# Patient Record
Sex: FEMALE | Race: WHITE | NOT HISPANIC OR LATINO | Employment: FULL TIME | URBAN - METROPOLITAN AREA
[De-identification: names, ages, dates, MRNs, and addresses within clinical notes are randomized per-mention and may not be internally consistent; named-entity substitution may affect disease eponyms.]

---

## 2025-03-18 ENCOUNTER — APPOINTMENT (OUTPATIENT)
Dept: OBSTETRICS AND GYNECOLOGY | Facility: CLINIC | Age: 50
End: 2025-03-18
Payer: COMMERCIAL

## 2025-03-18 VITALS
DIASTOLIC BLOOD PRESSURE: 98 MMHG | SYSTOLIC BLOOD PRESSURE: 144 MMHG | WEIGHT: 252 LBS | BODY MASS INDEX: 41.99 KG/M2 | HEIGHT: 65 IN

## 2025-03-18 DIAGNOSIS — Z12.31 ENCOUNTER FOR SCREENING MAMMOGRAM FOR MALIGNANT NEOPLASM OF BREAST: ICD-10-CM

## 2025-03-18 DIAGNOSIS — Z11.51 SCREENING FOR HUMAN PAPILLOMAVIRUS (HPV): Primary | ICD-10-CM

## 2025-03-18 DIAGNOSIS — O10.019 PRE-EXISTING ESSENTIAL HYPERTENSION DURING PREGNANCY, ANTEPARTUM (HHS-HCC): ICD-10-CM

## 2025-03-18 DIAGNOSIS — N92.6 IRREGULAR BLEEDING: ICD-10-CM

## 2025-03-18 DIAGNOSIS — Z12.4 SCREENING FOR CERVICAL CANCER: ICD-10-CM

## 2025-03-18 DIAGNOSIS — Z12.11 COLON CANCER SCREENING: ICD-10-CM

## 2025-03-18 DIAGNOSIS — I15.9 SECONDARY HYPERTENSION: ICD-10-CM

## 2025-03-18 PROCEDURE — 99386 PREV VISIT NEW AGE 40-64: CPT | Performed by: OBSTETRICS & GYNECOLOGY

## 2025-03-18 PROCEDURE — 87591 N.GONORRHOEAE DNA AMP PROB: CPT

## 2025-03-18 PROCEDURE — 87661 TRICHOMONAS VAGINALIS AMPLIF: CPT

## 2025-03-18 PROCEDURE — 3077F SYST BP >= 140 MM HG: CPT | Performed by: OBSTETRICS & GYNECOLOGY

## 2025-03-18 PROCEDURE — 3080F DIAST BP >= 90 MM HG: CPT | Performed by: OBSTETRICS & GYNECOLOGY

## 2025-03-18 PROCEDURE — 87624 HPV HI-RISK TYP POOLED RSLT: CPT

## 2025-03-18 PROCEDURE — 3008F BODY MASS INDEX DOCD: CPT | Performed by: OBSTETRICS & GYNECOLOGY

## 2025-03-18 PROCEDURE — 87491 CHLMYD TRACH DNA AMP PROBE: CPT

## 2025-03-18 RX ORDER — NIFEDIPINE 30 MG/1
30 TABLET, FILM COATED, EXTENDED RELEASE ORAL
Qty: 30 TABLET | Refills: 11 | Status: SHIPPED | OUTPATIENT
Start: 2025-03-18 | End: 2026-03-18

## 2025-03-18 ASSESSMENT — ENCOUNTER SYMPTOMS
AGITATION: 1
UNEXPECTED WEIGHT CHANGE: 1

## 2025-03-18 ASSESSMENT — PATIENT HEALTH QUESTIONNAIRE - PHQ9
1. LITTLE INTEREST OR PLEASURE IN DOING THINGS: NOT AT ALL
SUM OF ALL RESPONSES TO PHQ9 QUESTIONS 1 & 2: 2
2. FEELING DOWN, DEPRESSED OR HOPELESS: MORE THAN HALF THE DAYS
10. IF YOU CHECKED OFF ANY PROBLEMS, HOW DIFFICULT HAVE THESE PROBLEMS MADE IT FOR YOU TO DO YOUR WORK, TAKE CARE OF THINGS AT HOME, OR GET ALONG WITH OTHER PEOPLE: SOMEWHAT DIFFICULT

## 2025-03-18 NOTE — PROGRESS NOTES
Subjective   Patient ID: Lauro Alejandro is a 50 y.o. female who presents for No chief complaint on file..  HPI50 years old G3, P2 with prior tubal ligation presents for yearly exam but has chief complaint of periods are  becoming more more irregular.  She states that last month she had  bleeding  twice during the month with more cramping.  She has been feeling more lower back cramping and pain.  She also reports hot flashes and night sweats mood changes and significant weight gain.  She says she has not have a Pap smear for last 20 years but when she did have Pap smears they were normal.  She says that her  passed away 4 years ago or stroke but she has been with the same boyfriend for the last 3 years.  She says she needs to see primary care because she knows her blood pressures have been elevated.  She says her boyfriend is a nurse and is telling her she needs to to see a doctor.    Review of Systems   Constitutional:  Positive for unexpected weight change.   Endocrine: Positive for cold intolerance and heat intolerance.   Psychiatric/Behavioral:  Positive for agitation.    All other systems reviewed and are negative.      Objective   Physical Exam  Vitals reviewed.   Constitutional:       Appearance: Normal appearance. She is obese.   HENT:      Head: Normocephalic and atraumatic.      Nose: Nose normal.   Cardiovascular:      Rate and Rhythm: Normal rate and regular rhythm.   Pulmonary:      Effort: Pulmonary effort is normal.      Breath sounds: Normal breath sounds.   Chest:      Chest wall: No mass.   Breasts:     Right: Normal.      Left: Normal.   Abdominal:      General: Abdomen is flat. Bowel sounds are normal. There is no distension.      Palpations: Abdomen is soft. There is no mass.   Genitourinary:     General: Normal vulva.      Vagina: Normal.      Cervix: Normal.      Uterus: Normal.       Adnexa: Right adnexa normal and left adnexa normal.      Rectum: Normal.   Musculoskeletal:          General: Normal range of motion.      Cervical back: Normal range of motion.   Skin:     General: Skin is warm and dry.   Neurological:      General: No focal deficit present.      Mental Status: She is alert.   Psychiatric:         Mood and Affect: Mood normal.         Behavior: Behavior normal.         Assessment/Plan   Problem List Items Addressed This Visit    None  Visit Diagnoses         Codes    Screening for human papillomavirus (HPV)    -  Primary Z11.51    Relevant Orders    THINPREP PAP TEST (>30)    Encounter for screening mammogram for malignant neoplasm of breast     Z12.31    Relevant Orders    BI mammo bilateral screening tomosynthesis    Screening for cervical cancer     Z12.4    Relevant Orders    THINPREP PAP TEST (>30)    Pre-existing essential hypertension during pregnancy, antepartum ([facility])     O10.019    Secondary hypertension     I15.9    Relevant Medications    NIFEdipine ER (Procardia XL) 30 mg 24 hr tablet    Other Relevant Orders    Referral to Primary Care    Colon cancer screening     Z12.11    Relevant Orders    Cologuard® colon cancer screening    Irregular bleeding     N92.6    Relevant Orders    US PELVIS TRANSABDOMINAL WITH TRANSVAGINAL        Pap smear and STD screen was done today.  I have recommended a pelvic ultrasound and a mammogram as well as a Cologuard screening.   I have put a referral for her to see her primary care for screening and treatment of high blood pressure.  I did start her on Procardia 30 mg daily until she is seen by primary care.  This was E scribed to her pharmacy.  And I have recommended that she returns after the pelvic ultrasound for possible endometrial biopsy and to discuss finding possible options to treat.  We briefly discussed hormone replacement therapy pros and cons and she still will be a good candidate for low-dose hormone replacement therapy to transition through menopause.         Allie Barreto MD 03/18/25 10:26 AM

## 2025-03-19 LAB
C TRACH RRNA SPEC QL NAA+PROBE: NEGATIVE
N GONORRHOEA DNA SPEC QL PROBE+SIG AMP: NEGATIVE
T VAGINALIS RRNA SPEC QL NAA+PROBE: NEGATIVE

## 2025-04-01 LAB
CYTOLOGY CMNT CVX/VAG CYTO-IMP: NORMAL
HPV HR 12 DNA GENITAL QL NAA+PROBE: NEGATIVE
HPV HR GENOTYPES PNL CVX NAA+PROBE: NEGATIVE
HPV16 DNA SPEC QL NAA+PROBE: NEGATIVE
HPV18 DNA SPEC QL NAA+PROBE: NEGATIVE
LAB AP HPV GENOTYPE QUESTION: YES
LAB AP HPV HR: NORMAL
LAB AP PAP ADDITIONAL TESTS: NORMAL
LABORATORY COMMENT REPORT: NORMAL
PATH REPORT.ADDENDUM SPEC: NORMAL
PATH REPORT.TOTAL CANCER: NORMAL

## 2025-04-02 ENCOUNTER — HOSPITAL ENCOUNTER (OUTPATIENT)
Dept: RADIOLOGY | Facility: CLINIC | Age: 50
Discharge: HOME | End: 2025-04-02
Payer: COMMERCIAL

## 2025-04-02 ENCOUNTER — TELEPHONE (OUTPATIENT)
Dept: OBSTETRICS AND GYNECOLOGY | Facility: CLINIC | Age: 50
End: 2025-04-02
Payer: COMMERCIAL

## 2025-04-02 VITALS — BODY MASS INDEX: 42.68 KG/M2 | WEIGHT: 250 LBS | HEIGHT: 64 IN

## 2025-04-02 DIAGNOSIS — Z12.31 ENCOUNTER FOR SCREENING MAMMOGRAM FOR MALIGNANT NEOPLASM OF BREAST: ICD-10-CM

## 2025-04-02 DIAGNOSIS — N92.6 IRREGULAR BLEEDING: ICD-10-CM

## 2025-04-02 PROCEDURE — 77063 BREAST TOMOSYNTHESIS BI: CPT | Performed by: RADIOLOGY

## 2025-04-02 PROCEDURE — 77067 SCR MAMMO BI INCL CAD: CPT | Performed by: RADIOLOGY

## 2025-04-02 PROCEDURE — 76856 US EXAM PELVIC COMPLETE: CPT

## 2025-04-02 PROCEDURE — 77063 BREAST TOMOSYNTHESIS BI: CPT

## 2025-04-02 NOTE — TELEPHONE ENCOUNTER
----- Message from Allie Barreto sent at 4/1/2025  5:41 PM EDT -----  Repeat Pap in 1 year  ----- Message -----  From: Lab, Background User  Sent: 3/19/2025   4:45 PM EDT  To: Allie Barreto MD

## 2025-04-02 NOTE — TELEPHONE ENCOUNTER
Reviewed PAP results with pt. ASCUS/-HPV. Dr Barreto recommended repeating PAP in 1 year. Pt is scheduled for follow up appt on Tuesday. Advised pt to keep this appt. Pt agreeable with plan.

## 2025-04-08 ENCOUNTER — APPOINTMENT (OUTPATIENT)
Dept: OBSTETRICS AND GYNECOLOGY | Facility: CLINIC | Age: 50
End: 2025-04-08
Payer: COMMERCIAL

## 2025-04-08 VITALS
WEIGHT: 247 LBS | SYSTOLIC BLOOD PRESSURE: 150 MMHG | BODY MASS INDEX: 41.15 KG/M2 | DIASTOLIC BLOOD PRESSURE: 98 MMHG | HEIGHT: 65 IN

## 2025-04-08 DIAGNOSIS — N95.1 HOT FLASH, MENOPAUSAL: Primary | ICD-10-CM

## 2025-04-08 PROCEDURE — 3008F BODY MASS INDEX DOCD: CPT | Performed by: OBSTETRICS & GYNECOLOGY

## 2025-04-08 PROCEDURE — 99213 OFFICE O/P EST LOW 20 MIN: CPT | Performed by: OBSTETRICS & GYNECOLOGY

## 2025-04-08 RX ORDER — ESTRADIOL AND NORETHINDRONE ACETATE 1; .5 MG/1; MG/1
1 TABLET ORAL DAILY
Qty: 30 TABLET | Refills: 11 | Status: SHIPPED | OUTPATIENT
Start: 2025-04-08 | End: 2026-04-08

## 2025-04-08 NOTE — PROGRESS NOTES
Subjective   Patient ID: Lauro Alejandro is a 50 y.o. female who presents for No chief complaint on file..  HPI 50 years old G3, P2 menopausal who is here after a pelvic ultrasound was done to review the results and possibly starting on hormone replacement therapy.  She has had some irregular periods, hot flashes night sweats and sleep disturbances.  She denies any prior history of DVTs or pulmonary embolism.  She has never been on oral contraceptive in the past says always use condoms.  She denies any significant family history of clotting disorders.  No personal history of breast cancer and recent mammogram has been negative.    Review of Systems   All other systems reviewed and are negative.      Objective   Physical Exam  Constitutional:       Appearance: Normal appearance.   Neurological:      Mental Status: She is alert.   Psychiatric:         Mood and Affect: Mood normal.         Behavior: Behavior normal.         Thought Content: Thought content normal.         Judgment: Judgment normal.         Assessment/Plan   Problem List Items Addressed This Visit    None  Visit Diagnoses         Codes    Hot flash, menopausal    -  Primary N95.1    Relevant Medications    estradiol-norethindrone acet (Activella) 1-0.5 mg tablet    Other Relevant Orders    FSH & LH    Estradiol        We reviewed her pelvic ultrasound which shows thin endometrial lining of 2 mm.  Small uterine fibroid seen, both ovaries not visualized due to overlying bowel.   I have recommended that she has blood work done today FSH and estradiol level.  We discussed HRT's pros and cons and she prefers oral tablets.  I have started her on Activella 1 pill daily recommended a telehealth follow-up in 3 to 4 weeks.         Allie Barreto MD 04/08/25 12:12 PM

## 2025-04-09 ENCOUNTER — TELEPHONE (OUTPATIENT)
Dept: OBSTETRICS AND GYNECOLOGY | Facility: CLINIC | Age: 50
End: 2025-04-09
Payer: COMMERCIAL

## 2025-04-09 LAB
ESTRADIOL SERPL-MCNC: 47 PG/ML
FSH SERPL-ACNC: 19.2 MIU/ML
LH SERPL-ACNC: 18.6 MIU/ML

## 2025-04-09 NOTE — TELEPHONE ENCOUNTER
----- Message from Allie Barreto sent at 4/9/2025  1:12 PM EDT -----  Please notify her of normal hormone levels not in the menopausal range yet  ----- Message -----  From: [facility] In  Sent: 4/9/2025   9:34 AM EDT  To: Allie Barreto MD

## 2025-04-10 ENCOUNTER — TELEPHONE (OUTPATIENT)
Dept: OBSTETRICS AND GYNECOLOGY | Facility: CLINIC | Age: 50
End: 2025-04-10
Payer: COMMERCIAL

## 2025-04-10 NOTE — TELEPHONE ENCOUNTER
----- Message from Allie Barreto sent at 4/9/2025  1:12 PM EDT -----  Please notify her of normal hormone levels not in the menopausal range yet  ----- Message -----  From: [facility]  Sent: 4/9/2025   9:34 AM EDT  To: Allie Barreto MD

## 2025-04-28 ENCOUNTER — APPOINTMENT (OUTPATIENT)
Dept: OBSTETRICS AND GYNECOLOGY | Facility: CLINIC | Age: 50
End: 2025-04-28
Payer: COMMERCIAL

## 2025-04-28 DIAGNOSIS — N95.1 PERIMENOPAUSE: Primary | ICD-10-CM

## 2025-04-28 PROCEDURE — 99212 OFFICE O/P EST SF 10 MIN: CPT | Performed by: OBSTETRICS & GYNECOLOGY

## 2025-04-28 RX ORDER — ESTRADIOL 0.03 MG/D
1 PATCH TRANSDERMAL WEEKLY
Qty: 12 PATCH | Refills: 3 | Status: SHIPPED | OUTPATIENT
Start: 2025-04-28 | End: 2026-04-28

## 2025-04-28 NOTE — PROGRESS NOTES
Subjective   Patient ID: Lauro Alejandro is a 50 y.o. female who presents for Medication follow up .  HPI 50 years old who presented at the last visit with menopausal symptoms and had an an ultrasound and blood work for FSH LH and estradiol level is on telehealth today for follow-up.  She states that she started taking some hard for the last 3 weeks and it has helped with hot flashes slightly but she continues to have night sweats hot flashes fatigue she just started her cycle.  She says usually her cycles last about 7 to 8 days.  I reviewed her blood work and ultrasound with her which shows that FSH is at 19 with estradiol in the normal range.  Her pelvic ultrasound showed thin endometrial lining ovaries could not be seen due to bowel gas.    Review of Systems    Objective   Physical Exam  Constitutional:       Appearance: Normal appearance.   Pulmonary:      Effort: Pulmonary effort is normal.   Neurological:      Mental Status: She is alert.     Patient is in the car in the passenger side and says her boyfriend is driving.    Assessment/Plan   Problem List Items Addressed This Visit    None  Visit Diagnoses         Codes      Perimenopause    -  Primary N95.1        I have recommended that she sees her primary care and have evaluation for thyroid disease and possible other causes for her multiple symptoms.  I recommended that she stops the Activella and we can send her low-dose estrogen patch to apply once or twice a week to help her with hot flashes.  Patch would be safer and since she still has a cycles every 2 to 3 months then she most likely does not need progesterone at this time.  We will reevaluate for the need for combination hormone replacement therapy in few months.         Allie Barreto MD 04/28/25 4:47 PM

## 2025-06-12 ENCOUNTER — APPOINTMENT (OUTPATIENT)
Dept: PRIMARY CARE | Facility: CLINIC | Age: 50
End: 2025-06-12
Payer: COMMERCIAL